# Patient Record
Sex: FEMALE | Race: WHITE | NOT HISPANIC OR LATINO | URBAN - METROPOLITAN AREA
[De-identification: names, ages, dates, MRNs, and addresses within clinical notes are randomized per-mention and may not be internally consistent; named-entity substitution may affect disease eponyms.]

---

## 2018-03-08 ENCOUNTER — TELEPHONE (OUTPATIENT)
Dept: TRANSPLANT | Facility: CLINIC | Age: 48
End: 2018-03-08

## 2018-03-08 NOTE — TELEPHONE ENCOUNTER
"Skip to main content     Rubén Hendrickson  Tab Navigation  HomeKidney Donor Surveys(Currently Selected)Liver Donor SurveysKidney Quarterly ReportLiver Quarterly Report    Content Starts Here  Kidney Donor Survey  Kidney Donor Survey  [id number]     Printable View     Back to List: Kidney Donor Surveys  Open Activities[0]  Activity History[1]  Kidney Donor Survey Detail     LIVING KIDNEY DONOR EVALUATION  Donor First Name   Belinda  Donor MRN      Donor Last Name   Kevin  Completed   3/7/2018 8:54 PM     1970  Record ID   [id number]  BREEZE Screen   PASSED       Intended Recipient  Recipient First Name   ALTRUISTIC  Recipient MRN      Recipient Last Name   ALTRUISTIC  Relationship   N/A  Recipient       Recipient Diagnosis      Recipient's ABO           Donor Information  Age   47  Gender   Female  Height   5' 4''  Race     Weight   159  Ethnicity   Not /  BMI   27.3  Preferred Language   English       Required   No      Blood Type   A  Demographics  Home Address   [address] [place] [address] #   [phone]  [facility]  Alternate #      Zip Code   [id number]  Type      Country   [place]  Preferred Contact day   ur  Email   [email or web address]  Preferred Contact time   11:00 AM-1:00 PM  Donor's Medical Information  Medical History   Depression   Dysfunctional Uterine Bleeding   Endometriosis   History of miscarriage   History of pregnancy  Medications   None Reported  Surgical History   External Fixator for Tibial Fracture, Right   Hysterectomy   IM Mykel for Tibial Fracture, Right   Ovarian Cystectomy   Plating, Tibial Fracture, Right  Allergies   NKDA  Social History   EtOH: Rare (1-2 drinks/month)   Illicit Drug Use: Denies   Tobacco: Remote; Quit ; ( ppd x 5 years)  Self-Reported Functional Status   \"I am able to participate in strenuous sports such as swimming, singles tennis, football, basketball, or " "skiing\"  Family Medical History   Cancer (Mother, Aunt or Uncle, Grandparent)   Diabetes (denies)   Heart Disease (denies)   Hypertension (denies)   Kidney Disease (denies)   Kidney Stones (denies)  Exercise Frequency   Exercise (3 X per week)  Review of Organ Systems  Review of Systems   Airway or Lungs: No   Blood Disorder: No   Cancer: No   Diabetes,Thyroid,Adrenal,Endocrine Disorder: No   Digestive or Liver: No   Female Health: Yes   Heart or Circulatory System: No   Immune Diseases: No   Kidneys and Bladder: No   Muscles,Bones,Joints: No   Neuro: No   Psych: Yes  Donor's Social Information  Marital Status     Living Accommodation   Owns own home/apartment  Level of Education   Associate's or technical degree complete  Living Arrangement   With spouse  Employment Status   Part Time  Concerns: health and life insurance   No  Employer    [email or web address] [place]  Concerns: job security and lost income   No  Occupation           Medical Insurance Status   Has medical insurance       High Risk Behavior  High Risk Behaviors   Blood transfusion < 12 months. (NO)   Commercial sex < 12 months. (NO)   Illicit IV drug use < 5yrs. (NO)   Other high risk sexual contact < 12 months. (NO)  Reason for Donation  Referral   Social Media  Reason for Donation   So many people need help. I have two good kidneys. I only need one and I am very healthy.  Permission to Disclose Inquiry   Yes  Patient Comments      Donor Motivation Level   Highly motivated donor       PCP Contact  PCP Name   John Caro  PCP [facility]  PCP Phone   [phone]  Emergency Contact  First Name   Rafa  First Name   Damon  Last Name   Kevin  Last Name   Sabino  Phone #   [phone]  Phone #   [phone]  Phone Type   Mobile  Phone Type   Home  Relationship   Spouse  Relationship   Father  Office Use  Reviewed By   Camelia Abernathy   3/8/2018 8:39 AM  Admin Folder   Accept  Comments   Passed " edmundo Denise  Lost for Followup   Not Checked  Extended Comments      BREEZE ID   [email or web address]:[id number]  survey status   completed       Open Activities  No records to display  Activity History      Subject Task Due Date Last Modified Date/Time  Call Checked 3/8/2018 3/8/2018 12:20 PM  Back to TopBack To TopAlways show me  Show [facility] records per related list  The information captured in this patient survey is intended as a supplement to, not a substitute for, a complete medical history. All information captured in this patient survey must be reviewed and verified by a healthcare professional. The information is provided as is, and [facility] does not warrant that it is complete or accurate. As such, medical decisions cannot be made based exclusively on the results of this survey.

## 2018-03-09 DIAGNOSIS — Z00.5 TRANSPLANT DONOR EVALUATION: Primary | ICD-10-CM
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
